# Patient Record
Sex: FEMALE | HISPANIC OR LATINO | URBAN - METROPOLITAN AREA
[De-identification: names, ages, dates, MRNs, and addresses within clinical notes are randomized per-mention and may not be internally consistent; named-entity substitution may affect disease eponyms.]

---

## 2018-06-07 ENCOUNTER — OFFICE VISIT (OUTPATIENT)
Dept: URBAN - METROPOLITAN AREA CLINIC 88 | Facility: CLINIC | Age: 53
End: 2018-06-07
Payer: MEDICARE

## 2018-06-07 PROCEDURE — 99212 OFFICE O/P EST SF 10 MIN: CPT | Performed by: OPHTHALMOLOGY

## 2018-06-07 ASSESSMENT — INTRAOCULAR PRESSURE
OS: 11
OD: 12

## 2018-06-07 NOTE — IMPRESSION/PLAN
Impression: Trib rtnl vein occlusion, left eye, with macular edema: T02.5868. Plan: Emphasized blood pressure control. IOP is stable after Ozurdex injection. S/P Ozurdex 5/23/2018.

## 2018-08-20 ENCOUNTER — OFFICE VISIT (OUTPATIENT)
Dept: URBAN - METROPOLITAN AREA CLINIC 88 | Facility: CLINIC | Age: 53
End: 2018-08-20
Payer: MEDICARE

## 2018-08-20 DIAGNOSIS — H35.032 HYPERTENSIVE RETINOPATHY, LEFT EYE: ICD-10-CM

## 2018-08-20 PROCEDURE — 99213 OFFICE O/P EST LOW 20 MIN: CPT | Performed by: OPHTHALMOLOGY

## 2018-08-20 PROCEDURE — 92134 CPTRZ OPH DX IMG PST SGM RTA: CPT | Performed by: OPHTHALMOLOGY

## 2018-08-20 ASSESSMENT — INTRAOCULAR PRESSURE
OS: 14
OD: 14

## 2018-08-20 NOTE — IMPRESSION/PLAN
Impression: Trib rtnl vein occlusion, left eye, with macular edema: K56.9127. Plan: Discussed diagnosis in detail with patient. Emphasized blood sugar control. Keep future appts with PCP. OCT Macula OS today: BRVO w/CME. S/P Ozurdex 5/23/2018. Recommend Focal Laser treatment ,risks and benefits were discussed, explained and understood by patient.

## 2018-08-20 NOTE — IMPRESSION/PLAN
Impression: Hypertensive retinopathy, left eye: H35.032. Plan: Discussed diagnosis in detail with patient. Emphasized blood pressure control. Keep future appts with PCP.

## 2018-09-12 ENCOUNTER — PROCEDURE (OUTPATIENT)
Dept: URBAN - METROPOLITAN AREA CLINIC 88 | Facility: CLINIC | Age: 53
End: 2018-09-12
Payer: MEDICARE

## 2018-09-12 DIAGNOSIS — H34.8320 TRIB RTNL VEIN OCCLUSION, LEFT EYE, WITH MACULAR EDEMA: Primary | ICD-10-CM

## 2018-09-12 PROCEDURE — 67210 TREATMENT OF RETINAL LESION: CPT | Performed by: OPHTHALMOLOGY

## 2018-09-12 ASSESSMENT — INTRAOCULAR PRESSURE: OS: 16

## 2018-10-26 ENCOUNTER — TESTING ONLY (OUTPATIENT)
Dept: URBAN - METROPOLITAN AREA CLINIC 88 | Facility: CLINIC | Age: 53
End: 2018-10-26
Payer: COMMERCIAL

## 2018-10-26 DIAGNOSIS — H52.223 REGULAR ASTIGMATISM, BILATERAL: Primary | ICD-10-CM

## 2018-10-26 PROCEDURE — 92015 DETERMINE REFRACTIVE STATE: CPT | Performed by: OPTOMETRIST

## 2018-10-26 ASSESSMENT — VISUAL ACUITY
OD: 20/30
OS: 20/40

## 2018-12-03 ENCOUNTER — OFFICE VISIT (OUTPATIENT)
Dept: URBAN - METROPOLITAN AREA CLINIC 88 | Facility: CLINIC | Age: 53
End: 2018-12-03
Payer: MEDICAID

## 2018-12-03 PROCEDURE — 99213 OFFICE O/P EST LOW 20 MIN: CPT | Performed by: OPHTHALMOLOGY

## 2018-12-03 PROCEDURE — 92134 CPTRZ OPH DX IMG PST SGM RTA: CPT | Performed by: OPHTHALMOLOGY

## 2018-12-03 ASSESSMENT — INTRAOCULAR PRESSURE
OS: 16
OD: 14

## 2018-12-03 NOTE — IMPRESSION/PLAN
Impression: Trib rtnl vein occlusion, left eye, with macular edema: O37.1875. Plan: S/P BRVO OS. OCT Macula OS today: BRVO w/CME. I would recommend 3-4 treatments of Eylea injection ,risks and benefits were discussed, explained and understood by patient. Patient would like to proceed with recommendation.

## 2018-12-17 ENCOUNTER — OFFICE VISIT (OUTPATIENT)
Dept: URBAN - METROPOLITAN AREA CLINIC 88 | Facility: CLINIC | Age: 53
End: 2018-12-17
Payer: MEDICARE

## 2018-12-17 PROCEDURE — 67028 INJECTION EYE DRUG: CPT | Performed by: OPHTHALMOLOGY

## 2018-12-17 ASSESSMENT — INTRAOCULAR PRESSURE
OD: 18
OS: 20
OS: 14

## 2019-01-21 ENCOUNTER — PROCEDURE (OUTPATIENT)
Dept: URBAN - METROPOLITAN AREA CLINIC 88 | Facility: CLINIC | Age: 54
End: 2019-01-21
Payer: MEDICARE

## 2019-01-21 PROCEDURE — 67028 INJECTION EYE DRUG: CPT | Performed by: OPHTHALMOLOGY

## 2019-01-21 ASSESSMENT — INTRAOCULAR PRESSURE
OS: 10
OD: 16
OS: 15

## 2019-05-01 ENCOUNTER — PROCEDURE (OUTPATIENT)
Dept: URBAN - METROPOLITAN AREA CLINIC 88 | Facility: CLINIC | Age: 54
End: 2019-05-01
Payer: MEDICARE

## 2019-05-01 PROCEDURE — 67028 INJECTION EYE DRUG: CPT | Performed by: OPHTHALMOLOGY

## 2019-05-01 RX ORDER — DICLOFENAC SODIUM 1 MG/ML
0.1 % SOLUTION/ DROPS OPHTHALMIC
Qty: 1 | Refills: 3 | Status: INACTIVE
Start: 2019-05-01 | End: 2019-07-18

## 2019-05-01 RX ORDER — DUREZOL 0.5 MG/ML
0.05 % EMULSION OPHTHALMIC
Qty: 1 | Refills: 3 | Status: INACTIVE
Start: 2019-05-01 | End: 2019-05-06

## 2019-05-01 ASSESSMENT — INTRAOCULAR PRESSURE
OS: 14
OS: 18
OD: 14

## 2019-07-18 ENCOUNTER — OFFICE VISIT (OUTPATIENT)
Dept: URBAN - METROPOLITAN AREA CLINIC 88 | Facility: CLINIC | Age: 54
End: 2019-07-18
Payer: MEDICARE

## 2019-07-18 DIAGNOSIS — E11.3393 TYPE 2 DIAB WITH MOD NONP RTNOP WITHOUT MACULAR EDEMA, BI: ICD-10-CM

## 2019-07-18 PROCEDURE — 92134 CPTRZ OPH DX IMG PST SGM RTA: CPT | Performed by: OPHTHALMOLOGY

## 2019-07-18 PROCEDURE — 99214 OFFICE O/P EST MOD 30 MIN: CPT | Performed by: OPHTHALMOLOGY

## 2019-07-18 RX ORDER — DUREZOL 0.5 MG/ML
0.05 % EMULSION OPHTHALMIC
Qty: 1 | Refills: 3 | Status: ACTIVE
Start: 2019-07-18

## 2019-07-18 RX ORDER — DICLOFENAC SODIUM 1 MG/ML
0.1 % SOLUTION/ DROPS OPHTHALMIC
Qty: 1 | Refills: 3 | Status: ACTIVE
Start: 2019-07-18

## 2019-07-18 ASSESSMENT — INTRAOCULAR PRESSURE
OS: 17
OD: 18

## 2019-07-18 NOTE — IMPRESSION/PLAN
Impression: Type 2 diab with mod nonp rtnop without macular edema, bi: I59.5064. Plan: Discussed diagnosis in detail with patient. Discussed ocular and systemic benefits of blood sugar control. Keep future appts. with PCP. No treatment necessary at this time. Patient was instructed to monitor vision for sudden changes and to call if visual changes noted.

## 2019-07-18 NOTE — IMPRESSION/PLAN
Impression: Trib rtnl vein occlusion, left eye, with macular edema: B21.2664. OS. Condition: will likely improve. Plan: Discussed diagnosis in detail with patient. S/P Eylea injection OS (05/01/19). OCT Macula OS today: BRVO shows some improvement. Base on the OCT Macula and clinical exam. I would recommend Eylea injection treatment ,risks and benefits were discussed, explained and understood by patient.

## 2019-08-13 ENCOUNTER — PROCEDURE (OUTPATIENT)
Dept: URBAN - METROPOLITAN AREA CLINIC 88 | Facility: CLINIC | Age: 54
End: 2019-08-13
Payer: MEDICARE

## 2019-08-13 PROCEDURE — 67028 INJECTION EYE DRUG: CPT | Performed by: OPHTHALMOLOGY

## 2019-08-13 ASSESSMENT — INTRAOCULAR PRESSURE
OD: 16
OS: 16
OS: 20

## 2019-12-26 ENCOUNTER — OFFICE VISIT (OUTPATIENT)
Dept: URBAN - METROPOLITAN AREA CLINIC 88 | Facility: CLINIC | Age: 54
End: 2019-12-26
Payer: MEDICARE

## 2019-12-26 DIAGNOSIS — E11.3293 TYPE 2 DIAB WITH MILD NONP RTNOP WITHOUT MACULAR EDEMA, BI: ICD-10-CM

## 2019-12-26 PROCEDURE — 99214 OFFICE O/P EST MOD 30 MIN: CPT | Performed by: OPHTHALMOLOGY

## 2019-12-26 PROCEDURE — 92134 CPTRZ OPH DX IMG PST SGM RTA: CPT | Performed by: OPHTHALMOLOGY

## 2019-12-26 ASSESSMENT — INTRAOCULAR PRESSURE
OD: 13
OS: 14

## 2019-12-26 ASSESSMENT — KERATOMETRY
OS: 46.25
OD: 47.00

## 2019-12-26 NOTE — IMPRESSION/PLAN
Impression: Trib rtnl vein occlusion, left eye, with macular edema: J79.4382. OS. Condition: will likely improve. Plan: Discussed diagnosis in detail with patient. S/P Eylea injection OS (08/13/19. OCT Macula OS today: BRVO shows some improvement.  Base on the OCT Macula and clinical exam. I would recommend patient be seen in 6 months for a Dilated exam

## 2019-12-26 NOTE — IMPRESSION/PLAN
Impression: Type 2 diab with mild nonp rtnop without macular edema, bi: L62.0505. Plan: Discussed diagnosis in detail with patient. Discussed ocular and systemic benefits of blood sugar control. Keep future appts. with PCP. No treatment necessary at this time. Patient was instructed to monitor vision for sudden changes and to call if visual changes noted.

## 2023-08-29 ENCOUNTER — OFFICE VISIT (OUTPATIENT)
Dept: URBAN - METROPOLITAN AREA CLINIC 88 | Facility: CLINIC | Age: 58
End: 2023-08-29
Payer: MEDICARE

## 2023-08-29 DIAGNOSIS — H25.811 COMBINED FORMS OF AGE-RELATED CATARACT, RIGHT EYE: ICD-10-CM

## 2023-08-29 DIAGNOSIS — H25.12 AGE-RELATED NUCLEAR CATARACT, LEFT EYE: ICD-10-CM

## 2023-08-29 DIAGNOSIS — E11.3293 TYPE 2 DIABETES MELLITUS WITH MILD NONPROLIFERATIVE DIABETIC RETINOPATHY WITHOUT MACULAR EDEMA, BILATERAL: Primary | ICD-10-CM

## 2023-08-29 DIAGNOSIS — H34.8322 TRIBUTARY (BRANCH) RETINAL VEIN OCCLUSION, LEFT EYE, STABLE: ICD-10-CM

## 2023-08-29 PROCEDURE — 76519 ECHO EXAM OF EYE: CPT | Performed by: OPHTHALMOLOGY

## 2023-08-29 PROCEDURE — 76512 OPH US DX B-SCAN: CPT | Performed by: OPHTHALMOLOGY

## 2023-08-29 PROCEDURE — 92134 CPTRZ OPH DX IMG PST SGM RTA: CPT | Performed by: OPHTHALMOLOGY

## 2023-08-29 PROCEDURE — 99203 OFFICE O/P NEW LOW 30 MIN: CPT | Performed by: OPHTHALMOLOGY

## 2023-08-29 PROCEDURE — 92025 CPTRIZED CORNEAL TOPOGRAPHY: CPT | Performed by: OPHTHALMOLOGY

## 2023-08-29 ASSESSMENT — VISUAL ACUITY
OD: 20/400
OS: 20/30

## 2023-08-29 ASSESSMENT — INTRAOCULAR PRESSURE
OD: 15
OS: 15

## 2023-10-24 ENCOUNTER — SURGERY (OUTPATIENT)
Dept: URBAN - METROPOLITAN AREA SURGERY 56 | Facility: SURGERY | Age: 58
End: 2023-10-24
Payer: MEDICARE

## 2023-10-24 PROCEDURE — 66984 XCAPSL CTRC RMVL W/O ECP: CPT | Performed by: OPHTHALMOLOGY

## 2023-10-25 ENCOUNTER — POST-OPERATIVE VISIT (OUTPATIENT)
Dept: URBAN - METROPOLITAN AREA CLINIC 88 | Facility: CLINIC | Age: 58
End: 2023-10-25
Payer: MEDICARE

## 2023-10-25 DIAGNOSIS — Z48.810 ENCOUNTER FOR SURGICAL AFTERCARE FOLLOWING SURGERY ON A SENSE ORGAN: Primary | ICD-10-CM

## 2023-10-25 PROCEDURE — 99024 POSTOP FOLLOW-UP VISIT: CPT | Performed by: OPTOMETRIST

## 2023-10-25 RX ORDER — PREDNISOLONE ACETATE 10 MG/ML
1 % SUSPENSION/ DROPS OPHTHALMIC
Qty: 10 | Refills: 0 | Status: ACTIVE
Start: 2023-10-25

## 2023-10-25 RX ORDER — DICLOFENAC SODIUM 1 MG/ML
0.1 % SOLUTION/ DROPS OPHTHALMIC
Qty: 5 | Refills: 0 | Status: ACTIVE
Start: 2023-10-25

## 2023-10-25 ASSESSMENT — INTRAOCULAR PRESSURE
OD: 22
OD: 17

## 2023-11-01 ENCOUNTER — POST-OPERATIVE VISIT (OUTPATIENT)
Dept: URBAN - METROPOLITAN AREA CLINIC 88 | Facility: CLINIC | Age: 58
End: 2023-11-01
Payer: MEDICARE

## 2023-11-01 DIAGNOSIS — H52.223 REGULAR ASTIGMATISM, BILATERAL: Primary | ICD-10-CM

## 2023-11-01 DIAGNOSIS — Z48.810 ENCOUNTER FOR SURGICAL AFTERCARE FOLLOWING SURGERY ON A SENSE ORGAN: ICD-10-CM

## 2023-11-01 PROCEDURE — 99024 POSTOP FOLLOW-UP VISIT: CPT | Performed by: OPTOMETRIST

## 2023-11-01 ASSESSMENT — INTRAOCULAR PRESSURE
OD: 18
OS: 19

## 2023-11-01 ASSESSMENT — VISUAL ACUITY
OS: 20/20
OD: 20/20

## 2023-12-14 ENCOUNTER — POST-OPERATIVE VISIT (OUTPATIENT)
Dept: URBAN - METROPOLITAN AREA CLINIC 88 | Facility: CLINIC | Age: 58
End: 2023-12-14
Payer: MEDICARE

## 2023-12-14 DIAGNOSIS — H52.223 REGULAR ASTIGMATISM, BILATERAL: Primary | ICD-10-CM

## 2023-12-14 DIAGNOSIS — Z48.810 ENCOUNTER FOR SURGICAL AFTERCARE FOLLOWING SURGERY ON A SENSE ORGAN: ICD-10-CM

## 2023-12-14 PROCEDURE — 99024 POSTOP FOLLOW-UP VISIT: CPT | Performed by: OPTOMETRIST

## 2023-12-14 ASSESSMENT — VISUAL ACUITY
OS: 20/30
OD: 20/25

## 2023-12-14 ASSESSMENT — INTRAOCULAR PRESSURE
OD: 18
OS: 18